# Patient Record
Sex: MALE | Race: AMERICAN INDIAN OR ALASKA NATIVE
[De-identification: names, ages, dates, MRNs, and addresses within clinical notes are randomized per-mention and may not be internally consistent; named-entity substitution may affect disease eponyms.]

---

## 2019-10-05 ENCOUNTER — HOSPITAL ENCOUNTER (EMERGENCY)
Dept: HOSPITAL 5 - ED | Age: 49
Discharge: HOME | End: 2019-10-05
Payer: MEDICARE

## 2019-10-05 VITALS — SYSTOLIC BLOOD PRESSURE: 129 MMHG | DIASTOLIC BLOOD PRESSURE: 77 MMHG

## 2019-10-05 DIAGNOSIS — J45.909: ICD-10-CM

## 2019-10-05 DIAGNOSIS — M79.672: Primary | ICD-10-CM

## 2019-10-05 DIAGNOSIS — F17.200: ICD-10-CM

## 2019-10-05 NOTE — EMERGENCY DEPARTMENT REPORT
ED General Adult HPI





- General


Chief complaint: Extremity Injury, Lower


Stated complaint: LEFT FOOT PAIN


Time Seen by Provider: 10/05/19 09:50


Source: patient


Mode of arrival: Ambulatory


Limitations: No Limitations





- History of Present Illness


Initial comments: 





Patient is a 48-year-old male who presents with left foot pain has been going on

for 2 weeks patient states the pain is moderate nothing makes it better and 

nothing makes it worse.  Patient states that pain is moderate is a 4 out of 10 

as an achy type of pain it hurts more when he walks patient works as a  and

is on his feet often.  Patient has been trying Motrin for his pain.


Severity scale (0 -10): 8





- Related Data


                                    Allergies











Allergy/AdvReac Type Severity Reaction Status Date / Time


 


No Known Allergies Allergy   Unverified 10/11/13 04:14














ED Review of Systems


ROS: 


Stated complaint: LEFT FOOT PAIN


Other details as noted in HPI





Constitutional: denies: chills, fever


Eyes: denies: eye pain, eye discharge, vision change


ENT: denies: ear pain, throat pain


Respiratory: denies: cough, shortness of breath, wheezing


Cardiovascular: denies: chest pain, palpitations


Endocrine: no symptoms reported


Gastrointestinal: denies: abdominal pain, nausea, diarrhea


Genitourinary: denies: urgency, dysuria


Musculoskeletal: myalgia.  denies: back pain, joint swelling, arthralgia


Skin: denies: rash, lesions


Neurological: denies: headache, weakness, paresthesias


Psychiatric: denies: anxiety, depression


Hematological/Lymphatic: denies: easy bleeding, easy bruising





ED Past Medical Hx





- Past Medical History


Previous Medical History?: Yes


Hx Asthma: Yes


Additional medical history: hearing loss





- Surgical History


Past Surgical History?: No





- Social History


Smoking Status: Current Every Day Smoker


Substance Use Type: Alcohol





ED Physical Exam





- General


Limitations: No Limitations


General appearance: alert, in no apparent distress





- Head


Head exam: Present: atraumatic, normocephalic





- Eye


Eye exam: Present: normal appearance





- ENT


ENT exam: Present: mucous membranes moist





- Neck


Neck exam: Present: normal inspection





- Respiratory


Respiratory exam: Present: normal lung sounds bilaterally.  Absent: respiratory 

distress





- Cardiovascular


Cardiovascular Exam: Present: regular rate, normal rhythm.  Absent: systolic 

murmur, diastolic murmur, rubs, gallop





- GI/Abdominal


GI/Abdominal exam: Present: soft, normal bowel sounds





- Rectal


Rectal exam: Present: deferred





- Extremities Exam


Extremities exam: Present: normal inspection





- Back Exam


Back exam: Present: normal inspection





- Neurological Exam


Neurological exam: Present: alert, oriented X3





- Psychiatric


Psychiatric exam: Present: normal affect, normal mood





- Skin


Skin exam: Present: warm, dry, intact, normal color.  Absent: rash





ED Course





                                   Vital Signs











  10/05/19





  09:19


 


Temperature 97.4 F L


 


Pulse Rate 63


 


Respiratory 18





Rate 


 


Blood Pressure 129/77


 


O2 Sat by Pulse 97





Oximetry 














ED Medical Decision Making





- Radiology Data


Radiology results: report reviewed, image reviewed


Left x-ray foot: No acute osseous injury








- Medical Decision Making


Chief medical diagnosis: Left foot pain secondary to overuse


Differential medical diagnosis Pes planus, fracture





I will give patient Tylenol or Motrin and I'll get x-rays





Xray's look unremarkable I'll have patient follow up with primary care doctor 

and refer patient to have cushion soles. Discussed plan with patient patient 

agrees with plan. 





Critical care attestation.: 


If time is entered above; I have spent that time in minutes in the direct care 

of this critically ill patient, excluding procedure time.








ED Disposition


Clinical Impression: 


 Left foot pain





Disposition: DC-01 TO HOME OR SELFCARE


Is pt being admited?: No


Does the pt Need Aspirin: No


Condition: Stable


Instructions:  Arthralgia (ED)


Referrals: 


ILDA HILL MD [Referring] - 3-5 Days

## 2019-10-05 NOTE — XRAY REPORT
LEFT FOOT 2 VIEWS



INDICATION / CLINICAL INFORMATION:

foot pain.



COMPARISON:

None available.



FINDINGS:

No fracture, dislocation or soft tissue swelling is seen within the left foot. Joint spaces are well 
preserved.



Signer Name: Bin Gabriel MD 

Signed: 10/5/2019 10:24 AM

 Workstation Name: [id number]

## 2021-05-21 ENCOUNTER — HOSPITAL ENCOUNTER (EMERGENCY)
Dept: HOSPITAL 5 - ED | Age: 51
Discharge: HOME | End: 2021-05-21
Payer: MEDICAID

## 2021-05-21 VITALS — SYSTOLIC BLOOD PRESSURE: 170 MMHG | DIASTOLIC BLOOD PRESSURE: 96 MMHG

## 2021-05-21 DIAGNOSIS — K04.7: Primary | ICD-10-CM

## 2021-05-21 DIAGNOSIS — Z79.899: ICD-10-CM

## 2021-05-21 DIAGNOSIS — Z72.89: ICD-10-CM

## 2021-05-21 DIAGNOSIS — I10: ICD-10-CM

## 2021-05-21 DIAGNOSIS — J45.909: ICD-10-CM

## 2021-05-21 DIAGNOSIS — F17.200: ICD-10-CM

## 2021-05-21 LAB
BASOPHILS # (AUTO): 0.1 K/MM3 (ref 0–0.1)
BASOPHILS NFR BLD AUTO: 0.7 % (ref 0–1.8)
BUN SERPL-MCNC: 10 MG/DL (ref 9–20)
BUN/CREAT SERPL: 11 %
CALCIUM SERPL-MCNC: 9.1 MG/DL (ref 8.4–10.2)
EOSINOPHIL # BLD AUTO: 0 K/MM3 (ref 0–0.4)
EOSINOPHIL NFR BLD AUTO: 0.4 % (ref 0–4.3)
HCT VFR BLD CALC: 44 % (ref 35.5–45.6)
HEMOLYSIS INDEX: 4
HGB BLD-MCNC: 15.2 GM/DL (ref 11.8–15.2)
LYMPHOCYTES # BLD AUTO: 1.2 K/MM3 (ref 1.2–5.4)
LYMPHOCYTES NFR BLD AUTO: 14.1 % (ref 13.4–35)
MCHC RBC AUTO-ENTMCNC: 35 % (ref 32–34)
MCV RBC AUTO: 98 FL (ref 84–94)
MONOCYTES # (AUTO): 1.1 K/MM3 (ref 0–0.8)
MONOCYTES % (AUTO): 12.3 % (ref 0–7.3)
PLATELET # BLD: 260 K/MM3 (ref 140–440)
RBC # BLD AUTO: 4.49 M/MM3 (ref 3.65–5.03)

## 2021-05-21 PROCEDURE — 96365 THER/PROPH/DIAG IV INF INIT: CPT

## 2021-05-21 PROCEDURE — 80048 BASIC METABOLIC PNL TOTAL CA: CPT

## 2021-05-21 PROCEDURE — 85025 COMPLETE CBC W/AUTO DIFF WBC: CPT

## 2021-05-21 PROCEDURE — 36415 COLL VENOUS BLD VENIPUNCTURE: CPT

## 2021-05-21 PROCEDURE — 99283 EMERGENCY DEPT VISIT LOW MDM: CPT

## 2021-05-21 NOTE — EMERGENCY DEPARTMENT REPORT
ED General Adult HPI





- General


Chief complaint: Dental/Oral


Stated complaint: TOOTH ABCESS/BODY PAIN


Time Seen by Provider: 05/21/21 10:53


Source: patient


Mode of arrival: Ambulatory


Limitations: No Limitations





- History of Present Illness


Initial comments: 


50-year-old male states he went to another [facility] hospital on Monday and was 

placed on penicillin 4 times a day which he began on Tuesday.  He was diagnosed 

with a dental infection.  Today (Friday) he reports increased swelling.  He 

denies fever or chills.  He is able to swallow and breathe normally.  He admits 

no dental care chronically.  He also admits to noncompliance with his blood 

pressure medication.  He has had chronic problems with the right lower molar and

premolar dentition.





-: Gradual


Severity scale (0 -10): 9





- Related Data


                                  Previous Rx's











 Medication  Instructions  Recorded  Last Taken  Type


 


Clindamycin [Clindamycin CAP] 150 mg PO QID #50 capsule 05/21/21 Unknown Rx


 


HYDROcodone/APAP 5-325 [[place] 1 each PO Q6HR PRN #10 tablet 05/21/21 Unknown Rx





5/325]    


 


amLODIPine 5 mg PO DAILY #30 tab 05/21/21 Unknown Rx











                                    Allergies











Allergy/AdvReac Type Severity Reaction Status Date / Time


 


No Known Allergies Allergy   Unverified 10/11/13 04:14














ED Review of Systems


ROS: 


Stated complaint: TOOTH ABCESS/BODY PAIN


Other details as noted in HPI





Constitutional: denies: chills, fever


Eyes: denies: eye pain, vision change


ENT: dental pain.  denies: ear pain, throat pain


Respiratory: denies: cough, shortness of breath


Cardiovascular: denies: chest pain, palpitations


Endocrine: no symptoms reported


Gastrointestinal: denies: abdominal pain, nausea, diarrhea


Genitourinary: denies: urgency, dysuria


Musculoskeletal: denies: back pain, joint swelling, arthralgia


Skin: denies: rash, lesions


Neurological: denies: headache, weakness, paresthesias


Psychiatric: denies: anxiety, depression


Hematological/Lymphatic: denies: easy bleeding, easy bruising





ED Past Medical Hx





- Past Medical History


Previous Medical History?: Yes


Hx Asthma: Yes


Additional medical history: hearing loss





- Social History


Smoking Status: Current Every Day Smoker


Substance Use Type: Alcohol





- Medications


Home Medications: 


                                Home Medications











 Medication  Instructions  Recorded  Confirmed  Last Taken  Type


 


Clindamycin [Clindamycin CAP] 150 mg PO QID #50 capsule 05/21/21  Unknown Rx


 


HYDROcodone/APAP 5-325 [[place] 1 each PO Q6HR PRN #10 tablet 05/21/21  Unknown Rx





5/325]     


 


amLODIPine 5 mg PO DAILY #30 tab 05/21/21  Unknown Rx














ED Physical Exam





- General


Limitations: No Limitations


General appearance: alert, in no apparent distress





- Head


Head exam: Present: atraumatic, normocephalic





- Eye


Eye exam: Present: normal appearance.  Absent: scleral icterus





- ENT


ENT exam: Present: mucous membranes moist, other (Patient has soft tissue 

swelling without fluctuance of the right mandibular face.  It is 1-2+.  Does not

extend into the neck.  Related dentition is grossly carious and eroded.  There 

is no gross drainable abscess.  The oropharynx is otherwise unaffected)





- Neck


Neck exam: Present: normal inspection.  Absent: tenderness, meningismus





- Respiratory


Respiratory exam: Present: normal lung sounds bilaterally.  Absent: respiratory 

distress





- Cardiovascular


Cardiovascular Exam: Present: regular rate, normal rhythm.  Absent: systolic 

murmur, diastolic murmur, rubs, gallop





- GI/Abdominal


GI/Abdominal exam: Present: soft, normal bowel sounds.  Absent: distended, 

tenderness, guarding, rebound





- Rectal


Rectal exam: Present: deferred





- Extremities Exam


Extremities exam: Present: normal inspection





- Back Exam


Back exam: Present: normal inspection





- Neurological Exam


Neurological exam: Present: alert, oriented X3, CN II-XII intact.  Absent: motor

sensory deficit





- Psychiatric


Psychiatric exam: Present: normal affect, normal mood





- Skin


Skin exam: Present: warm, dry, intact, normal color.  Absent: rash





ED Course


                                   Vital Signs











  05/21/21 05/21/21 05/21/21





  10:45 12:18 12:40


 


Temperature 97.5 F L  


 


Pulse Rate 70  88


 


Respiratory 15 18 18





Rate   


 


Blood Pressure 179/159  


 


Blood Pressure   170/96





[Left]   


 


O2 Sat by Pulse 99 98 98





Oximetry   














  05/21/21 05/21/21





  12:52 12:55


 


Temperature  


 


Pulse Rate 88 


 


Respiratory 18 18





Rate  


 


Blood Pressure 170/96 


 


Blood Pressure  





[Left]  


 


O2 Sat by Pulse 98 98





Oximetry  














- Reevaluation(s)


Reevaluation #1: 


Patient's labs are within normal limits.  He was observed and had no progression

 of his swelling.  He was given IV clindamycin.


05/21/21 13:29














ED Medical Decision Making





- Lab Data


Result diagrams: 


                                 05/21/21 11:34





                                 05/21/21 11:34








                         Laboratory Results - last 24 hr











  05/21/21 05/21/21





  11:34 11:34


 


WBC  8.8 


 


RBC  4.49 


 


Hgb  15.2 


 


Hct  44.0 


 


MCV  98 H 


 


MCH  34 H 


 


MCHC  35 H 


 


RDW  12.7 L 


 


Plt Count  260 


 


Lymph % (Auto)  14.1 


 


Mono % (Auto)  12.3 H 


 


Eos % (Auto)  0.4 


 


Baso % (Auto)  0.7 


 


Lymph # (Auto)  1.2 


 


Mono # (Auto)  1.1 H 


 


Eos # (Auto)  0.0 


 


Baso # (Auto)  0.1 


 


Seg Neutrophils %  72.5 H 


 


Seg Neutrophils #  6.4 


 


Sodium   137


 


Potassium   3.9


 


Chloride   99.1


 


Carbon Dioxide   25


 


Anion Gap   17


 


BUN   10


 


Creatinine   0.9


 


Estimated GFR   > 60


 


BUN/Creatinine Ratio   11


 


Glucose   87


 


Calcium   9.1











Critical care attestation.: 


If time is entered above; I have spent that time in minutes in the direct care 

of this critically ill patient, excluding procedure time.








ED Disposition


Clinical Impression: 


 Dental infection, Uncontrolled hypertension





Disposition: DC-01 TO HOME OR SELFCARE


Is pt being admited?: No


Does the pt Need Aspirin: No


Condition: Stable


Instructions:  Dental Abscess, Easy-to-Read, Hypertension (ED), Hypertension, 

Adult, Easy-to-Read


Additional Instructions: 


It is possible that your infection may get worse.  I would recommend [facility] is a better choice for follow-up as they do have oral surgeons.  Return

 here as necessary.  Rx clindamycin, Vicodin and amlodipine for your blood 

pressure.  Dental/oral surgery follow-up is necessary.


Prescriptions: 


amLODIPine 5 mg PO DAILY #30 tab


Clindamycin [Clindamycin CAP] 150 mg PO QID #50 capsule


HYDROcodone/APAP 5-325 [[place] 5/325] 1 each PO Q6HR PRN #10 tablet


 PRN Reason: Pain


Referrals: 


PRIMARY CARE,MD [Primary Care Provider] - 3-5 Days


Oral surgeon, dentist [Other] - [facility] [Provider Group] - 2-3 Days


Time of Disposition: 13:33

## 2021-12-14 ENCOUNTER — HOSPITAL ENCOUNTER (EMERGENCY)
Dept: HOSPITAL 5 - ED | Age: 51
Discharge: HOME | End: 2021-12-14
Payer: SELF-PAY

## 2021-12-14 VITALS — DIASTOLIC BLOOD PRESSURE: 89 MMHG | SYSTOLIC BLOOD PRESSURE: 175 MMHG

## 2021-12-14 DIAGNOSIS — K02.9: ICD-10-CM

## 2021-12-14 DIAGNOSIS — Z79.899: ICD-10-CM

## 2021-12-14 DIAGNOSIS — K04.7: Primary | ICD-10-CM

## 2021-12-14 DIAGNOSIS — R03.0: ICD-10-CM

## 2021-12-14 DIAGNOSIS — Z72.89: ICD-10-CM

## 2021-12-14 DIAGNOSIS — J45.909: ICD-10-CM

## 2021-12-14 DIAGNOSIS — F17.200: ICD-10-CM

## 2021-12-14 PROCEDURE — 99282 EMERGENCY DEPT VISIT SF MDM: CPT

## 2021-12-14 NOTE — EMERGENCY DEPARTMENT REPORT
ED ENT HPI





- General


Chief complaint: Dental/Oral


Stated complaint: FACIAL EDEMA


Time Seen by Provider: 12/14/21 17:29


Source: patient


Mode of arrival: Ambulatory


Limitations: No Limitations





- History of Present Illness


Initial comments: 





Patient is a 51-year-old male presents emergency room with complaints of left 

lower dental pain that began a couple days ago.  He states this morning when he 

woke up he had a left lower facial swelling.  He denies any fever, vomiting, 

chills, difficulty swallowing, difficulty breathing.  No allergies to 

medications.  He states it has been several years since he has seen a dentist.





- Related Data


                                  Previous Rx's











 Medication  Instructions  Recorded  Last Taken  Type


 


Clindamycin [Clindamycin CAP] 150 mg PO QID #50 capsule 05/21/21 Unknown Rx


 


HYDROcodone/APAP 5-325 [[place] 1 each PO Q6HR PRN #10 tablet 05/21/21 Unknown Rx





5/325]    


 


Chlorhexidine Mouthwash [Peridex] 15 ml MM BID #1 bottle 12/14/21 Unknown Rx


 


Clindamycin [Clindamycin CAP] 300 mg PO TID 7 Days #21 cap 12/14/21 Unknown Rx


 


Naproxen 375 mg PO BID PRN #14 tablet 12/14/21 Unknown Rx


 


amLODIPine 5 mg PO DAILY #30 tab 12/14/21 Unknown Rx











                                    Allergies











Allergy/AdvReac Type Severity Reaction Status Date / Time


 


No Known Allergies Allergy   Verified 12/14/21 16:59














ED Dental HPI





- General


Chief complaint: Dental/Oral


Stated complaint: FACIAL EDEMA


Time Seen by Provider: 12/14/21 17:29


Source: patient


Mode of arrival: Ambulatory


Limitations: No Limitations





- Related Data


                                  Previous Rx's











 Medication  Instructions  Recorded  Last Taken  Type


 


Clindamycin [Clindamycin CAP] 150 mg PO QID #50 capsule 05/21/21 Unknown Rx


 


HYDROcodone/APAP 5-325 [[place] 1 each PO Q6HR PRN #10 tablet 05/21/21 Unknown Rx





5/325]    


 


Chlorhexidine Mouthwash [Peridex] 15 ml MM BID #1 bottle 12/14/21 Unknown Rx


 


Clindamycin [Clindamycin CAP] 300 mg PO TID 7 Days #21 cap 12/14/21 Unknown Rx


 


Naproxen 375 mg PO BID PRN #14 tablet 12/14/21 Unknown Rx


 


amLODIPine 5 mg PO DAILY #30 tab 12/14/21 Unknown Rx











                                    Allergies











Allergy/AdvReac Type Severity Reaction Status Date / Time


 


No Known Allergies Allergy   Verified 12/14/21 16:59














ED Review of Systems


ROS: 


Stated complaint: FACIAL EDEMA


Other details as noted in HPI





Comment: All other systems reviewed and negative





ED Past Medical Hx





- Past Medical History


Hx Asthma: Yes


Additional medical history: hearing loss





- Social History


Smoking Status: Current Every Day Smoker


Substance Use Type: Alcohol





- Medications


Home Medications: 


                                Home Medications











 Medication  Instructions  Recorded  Confirmed  Last Taken  Type


 


Clindamycin [Clindamycin CAP] 150 mg PO QID #50 capsule 05/21/21  Unknown Rx


 


HYDROcodone/APAP 5-325 [[place] 1 each PO Q6HR PRN #10 tablet 05/21/21  Unknown Rx





5/325]     


 


Chlorhexidine Mouthwash [Peridex] 15 ml MM BID #1 bottle 12/14/21  Unknown Rx


 


Clindamycin [Clindamycin CAP] 300 mg PO TID 7 Days #21 cap 12/14/21  Unknown Rx


 


Naproxen 375 mg PO BID PRN #14 tablet 12/14/21  Unknown Rx


 


amLODIPine 5 mg PO DAILY #30 tab 12/14/21  Unknown Rx














ED Physical Exam





- General


Limitations: No Limitations


General appearance: alert, in no apparent distress





- Head


Head exam: Present: atraumatic, normocephalic





- Eye


Eye exam: Present: normal appearance





- ENT


ENT exam: Present: mucous membranes moist, other (poor dentition, several dental

caries/missing teeth, there is left lower gum induration with left lower facial 

edema, uvula is midline, no uvular edema or deviation, no trismus, no tongue 

elevation, no muffled voice, no submandibular edema)





- Respiratory


Respiratory exam: Absent: respiratory distress, accessory muscle use





- Neurological Exam


Neurological exam: Present: alert, oriented X3





- Psychiatric


Psychiatric exam: Present: normal affect, normal mood





- Skin


Skin exam: Present: warm, dry





ED Course


                                   Vital Signs











  12/14/21 12/14/21





  16:58 18:26


 


Temperature 98.1 F 


 


Pulse Rate 60 62


 


Respiratory 16 





Rate  


 


Blood Pressure 183/84 


 


Blood Pressure  175/89





[Right]  


 


O2 Sat by Pulse 100 100





Oximetry  














ED Medical Decision Making





- Medical Decision Making





Patient is a 51-year-old male presents emergency room with complaints of left 

lower dental pain that began a couple days ago.  He states this morning when he 

woke up he had a left lower facial swelling.  He denies any fever, vomiting, c

hills, difficulty swallowing, difficulty breathing.  No allergies to 

medications.  He states it has been several years since he has seen a dentist.  

Vitals with blood pressure elevation, otherwise stable, patient was previously 

started on amlodipine while in the emergency department, he states he never 

followed up with a primary care doctor regarding his blood pressure, I advised 

patient that we would give him a 30-day supply of amlodipine, discussed the risk

 associated with long-term elevated blood pressure, discussed lifestyle 

modifications and keeping a blood pressure log.  On exam:poor dentition, several

 dental caries/missing teeth, there is left lower gum induration with left lower

 facial edema, uvula is midline, no uvular edema or deviation, no trismus, no 

tongue elevation, no muffled voice, no submandibular edema.  Examination appears

 consistent with dental abscess.  There is no facial abscess or signs of 

Abel's at this time.  Patient given prescription for medication.  Patient 

given community resources for dental clinic and primary care.  Advised patient 

Please take medication as prescribed.  Please follow-up with a dentist.  It is 

very important that you follow-up with a dentist.  Return to emergency room for 

any new or worsening symptoms.








Please follow-up with a primary care doctor regarding the elevation your blood 

pressure during today's visit.  Eat a low-sodium diet.  Increase your water 

intake.  Incorporate 30-60 minutes of daily exercise.  Keep a blood pressure 

log.


Critical care attestation.: 


If time is entered above; I have spent that time in minutes in the direct care 

of this critically ill patient, excluding procedure time.








ED Disposition


Clinical Impression: 


 Dental caries, Dental abscess, Dentalgia, Elevated blood pressure reading, Non-

compliance





Disposition: 01 HOME / SELF CARE / HOMELESS


Is pt being admited?: No


Does the pt Need Aspirin: No


Condition: Stable


Instructions:  Dental Abscess


Additional Instructions: 


Please take medication as prescribed.  Please follow-up with a dentist.  It is 

very important that you follow-up with a dentist.  Return to emergency room for 

any new or worsening symptoms.








Please follow-up with a primary care doctor regarding the elevation your blood 

pressure during today's visit.  Eat a low-sodium diet.  Increase your water 

intake.  Incorporate 36 minutes of daily exercise.  Keep a blood pressure log.


Prescriptions: 


amLODIPine 5 mg PO DAILY #30 tab


Clindamycin [Clindamycin CAP] 300 mg PO TID 7 Days #21 cap


Naproxen 375 mg PO BID PRN #14 tablet


 PRN Reason: pain


Chlorhexidine Mouthwash [Peridex] 15 ml MM BID #1 bottle


Referrals: 


[facility] [Outside] - 3-5 Days


CANDICE SARMIENTO MD [Staff Physician] - 3-5 Days


[facility] [Provider Group] - 3-5 Days


Forms:  Work/School Release Form(ED)


Time of Disposition: 18:03


Print Language: ENGLISH

## 2022-03-16 ENCOUNTER — HOSPITAL ENCOUNTER (EMERGENCY)
Dept: HOSPITAL 5 - ED | Age: 52
LOS: 1 days | Discharge: LEFT BEFORE DISCHARGE | End: 2022-03-17
Payer: SELF-PAY

## 2022-03-16 DIAGNOSIS — M54.9: Primary | ICD-10-CM

## 2022-03-16 DIAGNOSIS — Z53.21: ICD-10-CM

## 2022-03-17 VITALS — DIASTOLIC BLOOD PRESSURE: 96 MMHG | SYSTOLIC BLOOD PRESSURE: 179 MMHG
